# Patient Record
Sex: MALE | NOT HISPANIC OR LATINO | Employment: FULL TIME | URBAN - METROPOLITAN AREA
[De-identification: names, ages, dates, MRNs, and addresses within clinical notes are randomized per-mention and may not be internally consistent; named-entity substitution may affect disease eponyms.]

---

## 2022-10-07 NOTE — PROGRESS NOTES
"Does Fredi have a CPAP/Bipap?  No     [place] Sleep Scale: 5  BMI: 29.77    Fredi is a 41 year old who is being evaluated via a billable video visit.      How would you like to obtain your AVS? MyChart  If the video visit is dropped, the invitation should be resent by: Text to cell phone: [phone]  Will anyone else be joining your video visit? No  {If patient encounters technical issues they should call [phone] :461311}            Subjective   Fredi is a 41 year old, presenting for the following health issues:  Sleep Problem      HPI     ***    Review of Systems   {ROS COMP (Optional):207417}      Objective           Vitals:  No vitals were obtained today due to virtual visit.    Physical Exam   {video visit exam brief selected:447254::\"GENERAL: Healthy, alert and no distress\",\"EYES: Eyes grossly normal to inspection.  No discharge or erythema, or obvious scleral/conjunctival abnormalities.\",\"RESP: No audible wheeze, cough, or visible cyanosis.  No visible retractions or increased work of breathing.  \",\"SKIN: Visible skin clear. No significant rash, abnormal pigmentation or lesions.\",\"NEURO: Cranial nerves grossly intact.  Mentation and speech appropriate for age.\",\"PSYCH: Mentation appears normal, affect normal/bright, judgement and insight intact, normal speech and appearance well-groomed.\"}    {Diagnostic Test Results (Optional):864298}    {AMBULATORY ATTESTATION (Optional):171614}        Video-Visit Details    Video Start Time: {video visit start/end time for provider to select:766573}    Type of service:  Video Visit    Video End Time:{video visit start/end time for provider to select:947092}    Originating Location (pt. Location): {video visit patient location:645803::\"Home\"}    Distant Location (provider location):  [facility]     Platform used for Video Visit: {Virtual Visit Platforms:213871::\"HeTexted\"}      Sleep Consultation:    Date on this visit: 10/10/2022    Fredi Gibbs is " "sent by No ref. provider found for a sleep consultation regarding ***.    Primary Physician: No primary care provider on file.     Fredi Gibbs reports {FREQUENCY SLEEP PROBLEMS:456904} for ***.     Fredi goes to sleep at {TIMES OF DAY:904943} {AM/PM:159514} during the week. He wakes up at {TIMES OF DAY:090619} {AM/PM:968552} {WITH/WITHOUT:897244::\"with\"} an alarm. He falls asleep in {SLEEP TIMES:578478} minutes.  Fredi {HAS/DENIES/NA:865031} difficulty falling asleep.  He wakes up {WAKES UP:773609} times a night for {SLEEP TIMES:896421} minutes before falling back to sleep.  Fredi wakes up to {WAKING ACTIVITIES:248899}.  On weekends, Fredi goes to sleep at {TIMES OF DAY:166414} {AM/PM:723928}.  He wakes up at {TIMES OF DAY:041450} {AM/PM:116355} {WITH/WITHOUT:521626::\"with\"} an alarm. He falls asleep in {SLEEP TIMES:267255} minutes.  Patient gets an average of *** hours of sleep per night.     Patient {DOES AND DOES NOT:115168}.     Fredi {DOES/DOES NOT:223865::\"does not\"} do shift work.  He works {WORK SHIFT:794330} shifts.      Fredi {DOES/DOES NOT:041435::\"does not\"} snore {SNORE:136617}. Patient {DOES/NOT:343669} have a regular bed partner. There {IS/IS NOT:9024} report of {SLEEP BEHAVIOR:620807}.  He {DOES/DOES NOT:240339::\"does not\"} have witnessed apneas. They {OCCASIONALLY:422055} sleep separately.  Patient sleeps on his {SLEEP POSITION:723203::\"back\"}. He {HAS/DENIES SLEEP DISTURBANCE :122664}. Fredi {Parasomnia:569995}.    He {CONFIRM/DENY:605481} as a child.  Fredi {HAS/DENIES ROS:545682}.      Fredi has gained {NUMBERS 0-5,5-10,10-15:625467} pounds {TIMEFRAME:379294}.  Patient describes themself as {MORNING/NIGHT:112974} person.  He would prefer to go to sleep at {TIMES OF DAY:520603} {AM/PM:122263} and wake up at {TIMES OF DAY:075512} {AM/PM:124773}.  Patient's [place] Sleepiness score ***/24 {CONSISTENT:957238} with {MILD/MODERATE/SEVERE:317467} daytime sleepiness.      Fredi naps {NUMBER RANGE:487897} times per " {DAYS:581810} for {NAP RANGE:905277} minutes, feels {REFRESHED/UNREFRESHED:728816} after naps. He takes {NAPS:884971} inadvertant naps.  He {ADMIT/DENY:872895} {DOZIN} {DRIVIN}. The most recent episode was {NUMBERS 0-12:174198} {DAY:080355} ago.  Patient was counseled on the importance of driving while alert, to pull over if drowsy, or nap before getting into the vehicle if sleepy.  He uses {CAFFEINE INTAKE:792498}. Last caffeine intake is usually before ***.    Allergies:    No Known Allergies    Medications:    No current outpatient medications on file.       Problem List:  Patient Active Problem List    Diagnosis Date Noted     CARDIOVASCULAR SCREENING; LDL GOAL LESS THAN 160 10/31/2010     Priority: Medium        Past Medical/Surgical History:  Past Medical History:   Diagnosis Date     NO ACTIVE PROBLEMS      Past Surgical History:   Procedure Laterality Date     TONSILLECTOMY         Social History:  Social History     Socioeconomic History     Marital status:      Spouse name: Not on file     Number of children: Not on file     Years of education: Not on file     Highest education level: Not on file   Occupational History     Occupation:      Employer: [employer]   Tobacco Use     Smoking status: Never Smoker     Smokeless tobacco: Never Used   Substance and Sexual Activity     Alcohol use: Yes     Comment: occasional     Drug use: No     Sexual activity: Yes     Partners: Female   Other Topics Concern     Parent/sibling w/ CABG, MI or angioplasty before 65F 55M? Yes     Comment: father with cyst on his heart at 11 years old   Social History Narrative     Not on file     Social Determinants of Health     Financial Resource Strain: Not on file   Food Insecurity: Not on file   Transportation Needs: Not on file   Physical Activity: Not on file   Stress: Not on file   Social Connections: Not on file   Intimate Partner Violence: Not on file   Housing Stability: Not on file  "      Family History:  No family history on file.    Review of Systems:  A complete review of systems reviewed by me is negative with the exeption of what has been mentioned in the history of present illness.  {SLEEP ROS QUESTIONS:127370::\"CONSTITUTIONAL: NEGATIVE for weight gain/loss, fever, chills, sweats or night sweats, drug allergies.\",\"EYES: NEGATIVE for changes in vision, blind spots, double vision.\",\"ENT: NEGATIVE for ear pain, sore throat, sinus pain, post-nasal drip, runny nose, bloody nose\",\"CARDIAC: NEGATIVE for fast heartbeats or fluttering in chest, chest pain or pressure, breathlessness when lying flat, swollen legs or swollen feet.\",\"NEUROLOGIC: NEGATIVE headaches, weakness or numbness in the arms or legs.\",\"DERMATOLOGIC: NEGATIVE for rashes, new moles or change in mole(s)\",\"PULMONARY: NEGATIVE SOB at rest, SOB with activity, dry cough, productive cough, coughing up blood, wheezing or whistling when breathing.  \",\"GASTROINTESTINAL: NEGATIVE for nausea or vomitting, loose or watery stools, fat or grease in stools, constipation, abdominal pain, bowel movements black in color or blood noted.\",\"GENITOURINARY: NEGATIVE for pain during urination, blood in urine, urinating more frequently than usual, irregular menstrual periods.\",\"MUSCULOSKELETAL: NEGATIVE for muscle pain, bone or joint pain, swollen joints.\",\"ENDOCRINE: NEGATIVE for increased thirst or urination, diabetes.\",\"LYMPHATIC: NEGATIVE for swollen lymph nodes, lumps or bumps in the breasts or nipple discharge.\"}    Physical Examination:  Vitals: There were no vitals taken for this visit.  BMI= There is no height or weight on file to calculate BMI.         No flowsheet data found.         {SHORT EXAM:292095}  Mallampati Class: {YARED NUMERAL I-IV:156083}.  Tonsillar Stage: {NUMBERS 1-4:340932}.    Impression/Plan:    ***  Literature provided regarding {SLEEP APNEA:475193}.      He will follow up with me in approximately two weeks after his sleep " study has been competed to review the results and discuss plan of care.       Polysomnography reviewed.  Obstructive sleep apnea reviewed.  Complications of untreated sleep apnea were reviewed.    ***    CC: No ref. provider found

## 2022-10-10 ENCOUNTER — VIRTUAL VISIT (OUTPATIENT)
Dept: SLEEP MEDICINE | Facility: CLINIC | Age: 42
End: 2022-10-10
Payer: COMMERCIAL

## 2022-10-10 DIAGNOSIS — Z91.199 NO-SHOW FOR APPOINTMENT: Primary | ICD-10-CM

## 2022-10-10 NOTE — PROGRESS NOTES
"Does Fredi have a CPAP/Bipap?  No     [place] Sleep Scale: 5  BMI: 29.77    Fredi is a 41 year old who is being evaluated via a billable video visit.      How would you like to obtain your AVS? {AVS Preference:640176}  If the video visit is dropped, the invitation should be resent by: {video visit invitation (Optional) :148131}  Will anyone else be joining your video visit? {:781367}  {If patient encounters technical issues they should call [phone] :918933}        {PROVIDER CHARTING PREFERENCE:070327}    Subjective   Fredi is a 41 year old{ACCOMPANIED BY STATEMENT (Optional):429471}, presenting for the following health issues:  Sleep Problem (Snoring )      HPI     ***    Review of Systems   {ROS COMP (Optional):851626}      Objective           Vitals:  No vitals were obtained today due to virtual visit.    Physical Exam   {video visit exam brief selected:488071::\"GENERAL: Healthy, alert and no distress\",\"EYES: Eyes grossly normal to inspection.  No discharge or erythema, or obvious scleral/conjunctival abnormalities.\",\"RESP: No audible wheeze, cough, or visible cyanosis.  No visible retractions or increased work of breathing.  \",\"SKIN: Visible skin clear. No significant rash, abnormal pigmentation or lesions.\",\"NEURO: Cranial nerves grossly intact.  Mentation and speech appropriate for age.\",\"PSYCH: Mentation appears normal, affect normal/bright, judgement and insight intact, normal speech and appearance well-groomed.\"}    {Diagnostic Test Results (Optional):381241}    {AMBULATORY ATTESTATION (Optional):566855}        Video-Visit Details    Video Start Time: {video visit start/end time for provider to select:395214}    Type of service:  Video Visit    Video End Time:{video visit start/end time for provider to select:440916}    Originating Location (pt. Location): {video visit patient location:104633::\"Home\"}    Distant Location (provider location):  [facility]     Platform used for Video " "Visit: {Virtual Visit Platforms:368643::\"Carlos Enrique\"}    "

## 2022-10-11 ENCOUNTER — VIRTUAL VISIT (OUTPATIENT)
Dept: SLEEP MEDICINE | Facility: CLINIC | Age: 42
End: 2022-10-11
Payer: COMMERCIAL

## 2022-10-11 DIAGNOSIS — R29.818 SUSPECTED SLEEP APNEA: Primary | ICD-10-CM

## 2022-10-11 PROCEDURE — 99203 OFFICE O/P NEW LOW 30 MIN: CPT | Mod: 95 | Performed by: PHYSICIAN ASSISTANT

## 2022-10-11 NOTE — PATIENT INSTRUCTIONS
"      MY TREATMENT INFORMATION FOR SLEEP APNEA-  Fredi Gibbs    DOCTOR : AKBAR Diaz-MOISES    Am I having a sleep study at a sleep center?  --->Due to normal delays, you will be contacted within 2-4 weeks to schedule    Am I having a home sleep study?  --->Watch the video for the device you are using:    -/drop off device-   https://www.[employer].com/watch?v=yGGFBdELGhk    -Disposable device sent out require phone/computer application-   https://www.[employer].com/watch?v=BCce_vbiwxE      Frequently asked questions:  1. What is Obstructive Sleep Apnea (GM)? MG is the most common type of sleep apnea. Apnea means, \"without breath.\"  Apnea is most often caused by narrowing or collapse of the upper airway as muscles relax during sleep.   Almost everyone has occasional apneas. Most people with sleep apnea have had brief interruptions at night frequently for many years.  The severity of sleep apnea is related to how frequent and severe the events are.   2. What are the consequences of MG? Symptoms include: feeling sleepy during the day, snoring loudly, gasping or stopping of breathing, trouble sleeping, and occasionally morning headaches or heartburn at night.  Sleepiness can be serious and even increase the risk of falling asleep while driving. Other health consequences may include development of high blood pressure and other cardiovascular disease in persons who are susceptible. Untreated MG  can contribute to heart disease, stroke and diabetes.   3. What are the treatment options? In most situations, sleep apnea is a lifelong disease that must be managed with daily therapy. Medications are not effective for sleep apnea and surgery is generally not considered until other therapies have been tried. Your treatment is your choice . Continuous Positive Airway (CPAP) works right away and is the therapy that is effective in nearly everyone. An oral device to hold your jaw forward is usually the next most reliable " option. Other options include postioning devices (to keep you off your back), weight loss, and surgery including a tongue pacing device. There is more detail about some of these options below.  4. Are my sleep studies covered by insurance? Although we will request verification of coverage, we advise you also check in advance of the study to ensure there is coverage.    Important tips for those choosing CPAP and similar devices   Know your equipment:  CPAP is continuous positive airway pressure that prevents obstructive sleep apnea by keeping the throat from collapsing while you are sleeping. In most cases, the device is  smart  and can slowly self-adjusts if your throat collapses and keeps a record every day of how well you are treated-this information is available to you and your care team.  BPAP is bilevel positive airway pressure that keeps your throat open and also assists each breath with a pressure boost to maintain adequate breathing.  Special kinds of BPAP are used in patients who have inadequate breathing from lung or heart disease. In most cases, the device is  smart  and can slowly self-adjusts to assist breathing. Like CPAP, the device keeps a record of how well you are treated.  Your mask is your connection to the device. You get to choose what feels most comfortable and the staff will help to make sure if fits. Here: are some examples of the different masks that are available:       Key points to remember on your journey with sleep apnea:  Sleep study.  PAP devices often need to be adjusted during a sleep study to show that they are effective and adjusted right.  Good tips to remember: Try wearing just the mask during a quiet time during the day so your body adapts to wearing it. A humidifier is recommended for comfort in most cases to prevent drying of your nose and throat. Allergy medication from your provider may help you if you are having nasal congestion.  Getting settled-in. It takes more than  one night for most of us to get used to wearing a mask. Try wearing just the mask during a quiet time during the day so your body adapts to wearing it. A humidifier is recommended for comfort in most cases. Our team will work with you carefully on the first day and will be in contact within 4 days and again at 2 and 4 weeks for advice and remote device adjustments. Your therapy is evaluated by the device each day.   Use it every night. The more you are able to sleep naturally for 7-8 hours, the more likely you will have good sleep and to prevent health risks or symptoms from sleep apnea. Even if you use it 4 hours it helps. Occasionally all of us are unable to use a medical therapy, in sleep apnea, it is not dangerous to miss one night.   Communicate. Call our skilled team on the number provided on the first day if your visit for problems that make it difficult to wear the device. Over 2 out of 3 patients can learn to wear the device long-term with help from our team. Remember to call our team or your sleep providers if you are unable to wear the device as we may have other solutions for those who cannot adapt to mask CPAP therapy. It is recommended that you sleep your sleep provider within the first 3 months and yearly after that if you are not having problems.   Use it for your health. We encourage use of CPAP masks during daytime quiet periods to allow your face and brain to adapt to the sensation of CPAP so that it will be a more natural sensation to awaken to at night or during naps. This can be very useful during the first few weeks or months of adapting to CPAP though it does not help medically to wear CPAP during wakefulness and  should not be used as a strategy just to meet guidelines.  Take care of your equipment. Make sure you clean your mask and tubing using directions every day and that your filter and mask are replaced as recommended or if they are not working.     BESIDES CPAP, WHAT OTHER THERAPIES  ARE THERE?    Positioning Device  Positioning devices are generally used when sleep apnea is mild and only occurs on your back.This example shows a pillow that straps around the waist. It may be appropriate for those whose sleep study shows milder sleep apnea that occurs primarily when lying flat on one's back. Preliminary studies have shown benefit but effectiveness at home may need to be verified by a home sleep test. These devices are generally not covered by medical insurance.  Examples of devices that maintain sleeping on the back to prevent snoring and mild sleep apnea.    Belt type body positioner  http://[employer]/    Electronic reminder  http://nightshifttherapy.[email or web address]            Oral Appliance  What is oral appliance therapy?  An oral appliance device fits on your teeth at night like a retainer used after having braces. The device is made by a specialized dentist and requires several visits over 1-2 months before a manufactured device is made to fit your teeth and is adjusted to prevent your sleep apnea. Once an oral device is working properly, snoring should be improved. A home sleep test may be recommended at that time if to determine whether the sleep apnea is adequately treated.       Some things to remember:  -Oral devices are often, but not always, covered by your medical insurance. Be sure to check with your insurance provider.   -If you are referred for oral therapy, you will be given a list of specialized dentists to consider or you may choose to visit the Web site of the American Academy of Dental Sleep Medicine  -Oral devices are less likely to work if you have severe sleep apnea or are extremely overweight.     More detailed information  An oral appliance is a small acrylic device that fits over the upper and lower teeth  (similar to a retainer or a mouth guard). This device slightly moves jaw forward, which moves the base of the tongue forward, opens the airway, improves breathing for  effective treat snoring and obstructive sleep apnea in perhaps 7 out of 10 people .  The best working devices are custom-made by a dental device  after a mold is made of the teeth 1, 2, 3.  When is an oral appliance indicated?  Oral appliance therapy is recommended as a first-line treatment for patients with primary snoring, mild sleep apnea, and for patients with moderate sleep apnea who prefer appliance therapy to use of CPAP4, 5. Severity of sleep apnea is determined by sleep testing and is based on the number of respiratory events per hour of sleep.   How successful is oral appliance therapy?  The success rate of oral appliance therapy in patients with mild sleep apnea is 75-80% while in patients with moderate sleep apnea it is 50-70%. The chance of success in patients with severe sleep apnea is 40-50%. The research also shows that oral appliances have a beneficial effect on the cardiovascular health of MG patients at the same magnitude as CPAP therapy7.  Oral appliances should be a second-line treatment in cases of severe sleep apnea, but if not completely successful then a combination therapy utilizing CPAP plus oral appliance therapy may be effective. Oral appliances tend to be effective in a broad range of patients although studies show that the patients who have the highest success are females, younger patients, those with milder disease, and less severe obesity. 3, 6.   Finding a dentist that practices dental sleep medicine  Specific training is available through the American Academy of Dental Sleep Medicine for dentists interested in working in the field of sleep. To find a dentist who is educated in the field of sleep and the use of oral appliances, near you, visit the Web site of the American Academy of Dental Sleep Medicine.    References  1. Eliezer et al. Objectively measured vs self-reported compliance during oral appliance therapy for sleep-disordered breathing. Chest 2013; 144(5):  6102-2412.  2. Yandel et al. Objective measurement of compliance during oral appliance therapy for sleep-disordered breathing. Thorax 2013; 68(1): 91-96.  3. Ruslan et al. Mandibular advancement devices in 620 men and women with MG and snoring: tolerability and predictors of treatment success. Chest 2004; 125: 6268-9476.  4. Nigel, et al. Oral appliances for snoring and MG: a review. Sleep 2006; 29: 244-262.  5. Kayleigh et al. Oral appliance treatment for MG: an update. J Clin Sleep Med 2014; 10(2): 215-227.  6. Kike et al. Predictors of OSAH treatment outcome. J Dent Res 2007; 86: 9244-6989.      Weight Loss:    Weight loss is a long-term strategy that may improve sleep apnea in some patients.    Weight management is a personal decision and the decision should be based on your interest and the potential benefits.  If you are interested in exploring weight loss strategies, the following discussion covers the impact on weight loss on sleep apnea and the approaches that may be successful.    Being overweight does not necessarily mean you will have health consequences.  Those who have BMI over 35 or over 27 with existing medical conditions carries greater risk.   Weight loss decreases severity of sleep apnea in most people with obesity. For those with mild obesity who have developed snoring with weight gain, even 15-30 pound weight loss can improve and occasionally eliminate sleep apnea.  Structured and life-long dietary and health habits are necessary to lose weight and keep healthier weight levels.     Though there may be significant health benefits from weight loss, long-term weight loss is very difficult to achieve- studies show success with dietary management in less than 10% of people. In addition, substantial weight loss may require years of dietary control and may be difficult if patients have severe obesity. In these cases, surgical management may be considered.  Finally, older  individuals who have tolerated obesity without health complications may be less likely to benefit from weight loss strategies.      [unfilled]    Surgery:    Surgery for obstructive sleep apnea is considered generally only when other therapies fail to work. Surgery may be discussed with you if you are having a difficult time tolerating CPAP and or when there is an abnormal structure that requires surgical correction.  Nose and throat surgeries often enlarge the airway to prevent collapse.  Most of these surgeries create pain for 1-2 weeks and up to half of the most common surgeries are not effective throughout life.  You should carefully discuss the benefits and drawbacks to surgery with your sleep provider and surgeon to determine if it is the best solution for you.   More information  Surgery for MG is directed at areas that are responsible for narrowing or complete obstruction of the airway during sleep.  There are a wide range of procedures available to enlarge and/or stabilize the airway to prevent blockage of breathing in the three major areas where it can occur: the palate, tongue, and nasal regions.  Successful surgical treatment depends on the accurate identification of the factors responsible for obstructive sleep apnea in each person.  A personalized approach is required because there is no single treatment that works well for everyone.  Because of anatomic variation, consultation with an examination by a sleep surgeon is a critical first step in determining what surgical options are best for each patient.  In some cases, examination during sedation may be recommended in order to guide the selection of procedures.  Patients will be counseled about risks and benefits as well as the typical recovery course after surgery. Surgery is typically not a cure for a person s MG.  However, surgery will often significantly improve one s MG severity (termed  success rate ).  Even in the absence of a cure, surgery  will decrease the cardiovascular risk associated with OSA7; improve overall quality of life8 (sleepiness, functionality, sleep quality, etc).      Palate Procedures:  Patients with MG often have narrowing of their airway in the region of their tonsils and uvula.  The goals of palate procedures are to widen the airway in this region as well as to help the tissues resist collapse.  Modern palate procedure techniques focus on tissue conservation and soft tissue rearrangement, rather than tissue removal.  Often the uvula is preserved in this procedure. Residual sleep apnea is common in patient after pharyngoplasty with an average reduction in sleep apnea events of 33%2.      Tongue Procedures:  ExamWhile patients are awake, the muscles that surround the throat are active and keep this region open for breathing. These muscles relax during sleep, allowing the tongue and other structures to collapse and block breathing.  There are several different tongue procedures available.  Selection of a tongue base procedure depends on characteristics seen on physical exam.  Generally, procedures are aimed at removing bulky tissues in this area or preventing the back of the tongue from falling back during sleep.  Success rates for tongue surgery range from 50-62%3.    Hypoglossal Nerve Stimulation:  Hypoglossal nerve stimulation has recently received approval from the [place] Food and Drug Administration for the treatment of obstructive sleep apnea.  This is based on research showing that the system was safe and effective in treating sleep apnea6.  Results showed that the median AHI score decreased 68%, from 29.3 to 9.0. This therapy uses an implant system that senses breathing patterns and delivers mild stimulation to airway muscles, which keeps the airway open during sleep.  The system consists of three fully implanted components: a small generator (similar in size to a pacemaker), a breathing sensor, and a stimulation lead.   Using a small handheld remote, a patient turns the therapy on before bed and off upon awakening.    Candidates for this device must be greater than 22 years of age, have moderate to severe MG (AHI between 20-65), BMI less than 32, have tried CPAP/oral appliance without success, and have appropriate upper airway anatomy (determined by a sleep endoscopy performed by Dr. Gale).    Hypoglossal Nerve Stimulation Pathway:    The sleep surgeon s office will work with the patient through the insurance prior-authorization process (including communications and appeals).    Nasal Procedures:  Nasal obstruction can interfere with nasal breathing during the day and night.  Studies have shown that relief of nasal obstruction can improve the ability of some patients to tolerate positive airway pressure therapy for obstructive sleep apnea1.  Treatment options include medications such as nasal saline, topical corticosteroid and antihistamine sprays, and oral medications such as antihistamines or decongestants. Non-surgical treatments can include external nasal dilators for selected patients. If these are not successful by themselves, surgery can improve the nasal airway either alone or in combination with these other options.      Combination Procedures:  Combination of surgical procedures and other treatments may be recommended, particularly if patients have more than one area of narrowing or persistent positional disease.  The success rate of combination surgery ranges from 66-80%2,3.    References  Davis DE LEON. The Role of the Nose in Snoring and Obstructive Sleep Apnoea: An Update.  Eur Arch Otorhinolaryngol. 2011; 268: 1365-73.   Maria Ines SM; Tricia JA; Donna JR; Pallanch JF; Luis M MB; Rei SG; Prasad ASH. Surgical modifications of the upper airway for obstructive sleep apnea in adults: a systematic review and meta-analysis. SLEEP 2010;33(10):0836-4730. Chuck BUENO. Hypopharyngeal surgery in obstructive sleep apnea: an  evidence-based medicine review.  Arch Otolaryngol Head Neck Surg. 2006 Feb;132(2):206-13.  Vahid YH1, Irma Y, Nirav NAKITA. The efficacy of anatomically based multilevel surgery for obstructive sleep apnea. Otolaryngol Head Neck Surg. 2003 Oct;129(4):327-35.  Chuck BUENO, Goldberg A. Hypopharyngeal Surgery in Obstructive Sleep Apnea: An Evidence-Based Medicine Review. Arch Otolaryngol Head Neck Surg. 2006 Feb;132(2):206-13.  Tello MEEK et al. Upper-Airway Stimulation for Obstructive Sleep Apnea.  N Engl J Med. 2014 Jan 9;370(2):139-49.  Pérez Y et al. Increased Incidence of Cardiovascular Disease in Middle-aged Men with Obstructive Sleep Apnea. Am J Respir Crit Care Med; 2002 166: 159-165  Starkeyrishabh CLARK et al. Studying Life Effects and Effectiveness of Palatopharyngoplasty (SLEEP) study: Subjective Outcomes of Isolated Uvulopalatopharyngoplasty. Otolaryngol Head Neck Surg. 2011; 144: 623-631.        WHAT IF I ONLY HAVE SNORING?    Mandibular advancement devices, lateral sleep positioning, long-term weight loss and treatment of nasal allergies have been shown to improve snoring.  Exercising tongue muscles with a game (Riskthinktankttps://apps.[employer]ant[employer]pper/us/sandy/soundly-reduce-snoring/yq9945784966) or stimulating the tongue during the day with a device (https://doi.org/10.3390/xip19349332) have improved snoring in some individuals.    Remember to Drive Safe... Drive Alive     Sleep health profoundly affects your health, mood, and your safety.  Thirty three percent of the population (one in three of us) is not getting enough sleep and many have a sleep disorder. Not getting enough sleep or having an untreated / undertreated sleep condition may make us sleepy without even knowing it. In fact, our driving could be dramatically impaired due to our sleep health. As your provider, here are some things I would like you to know about driving:     Here are some warning signs for impairment and dangerous drowsy driving:              -Having been  awake more than 16 hours               -Looking tired               -Eyelid drooping              -Head nodding (it could be too late at this point)              -Driving for more than 30 minutes     Some things you could do to make the driving safer if you are experiencing some drowsiness:              -Stop driving and rest              -Call for transportation              -Make sure your sleep disorder is adequately treated     Some things that have been shown NOT to work when experiencing drowsiness while driving:              -Turning on the radio              -Opening windows              -Eating any  distracting  /  entertaining  foods (e.g., sunflower seeds, candy, or any other)              -Talking on the phone      Your decision may not only impact your life, but also the life of others. Please, remember to drive safe for yourself and all of us.

## 2022-10-11 NOTE — PROGRESS NOTES
Does Fredi have a CPAP/Bipap?  No     [place] Sleep Scale: 5  BMI: 29.77    Fredi is a 41 year old who is being evaluated via a billable video visit.      How would you like to obtain your AVS? MyChart  If the video visit is dropped, the invitation should be resent by: Text to cell phone: [phone]  Will anyone else be joining your video visit? No              Subjective   Fredi is a 41 year old presenting for the following health issues:  Sleep Problem (Snoring )            Objective           Vitals:  No vitals were obtained today due to virtual visit.    Physical Exam   GENERAL: Healthy, alert and no distress  EYES: Eyes grossly normal to inspection.  No discharge or erythema, or obvious scleral/conjunctival abnormalities.  RESP: No audible wheeze, cough, or visible cyanosis.  No visible retractions or increased work of breathing.    SKIN: Visible skin clear. No significant rash, abnormal pigmentation or lesions.  NEURO: Cranial nerves grossly intact.  Mentation and speech appropriate for age.  PSYCH: Mentation appears normal, affect normal/bright, judgement and insight intact, normal speech and appearance well-groomed.            Video-Visit Details    Video Start Time: 8:26 AM    Type of service:  Video Visit    Video End Time:8:47 AM    Originating Location (pt. Location): Home    Distant Location (provider location):  [facility]     Platform used for Video Visit: [facility]    Outpatient Sleep Medicine Consultation:      Name: Fredi Gibbs MRN# [id number]   Age: 41 year old YOB: 1980     Date of Consultation: October 11, 2022  Consultation is requested by: No referring provider defined for this encounter. No ref. provider found  Primary care provider: No primary care provider on file.       Reason for Sleep Consult:     Fredi Gibbs is sent by No ref. provider found for a sleep consultation regarding snoring, sleep apnea..    Patient s Reason for visit  snoring  Fredi Gibbs  main reason for visit:  possible sleep apnea  Patient states problem(s) started:    Fredi Gibbs's goals for this visit:             Assessment and Plan:     Summary Sleep Diagnoses:  Suspected sleep apnea- snoring, witnessed apneas, jesi in father.     Comorbid Diagnoses:  none      Summary Recommendations:  Home sleep study for suspected sleep apnea.   No orders of the defined types were placed in this encounter.        Summary Counseling:    Sleep Testing Reviewed  Obstructive Sleep Apnea Reviewed  Complications of Untreated Sleep Apnea Reviewed      Medical Decision-making:   Educational materials provided in instructions    Total time spent reviewing medical records, history and physical examination, review of previous testing and interpretation as well as documentation on this date:30 min    CC: No ref. provider found          History of Present Illness:     Past Sleep Evaluations:    SLEEP-WAKE SCHEDULE:     Work/School Days: Patient goes to school/work:     Usually gets into bed at  10-11  Takes patient about  5 min or less to fall asleep  Has trouble falling asleep   0 nights per week  Wakes up in the middle of the night  2 times.  Wakes up due to  bathroom, uncertain reasons  He has trouble falling back asleep  0  times a week.   It usually takes   5 [place] get back to sleep  Patient is usually up at  5 AM  Uses alarm:  yes    Weekends/Non-work Days/All Other Days:  Usually gets into bed at   10-11  Takes patient about  5 min to fall asleep  Patient is usually up at  5 am-7 AM  Uses alarm:      Sleep Need  Patient gets    6-7 sleep on average   Patient thinks he needs about   6-7 sleep    Fredi Gibbs prefers to sleep in this position(s):     Patient states they do the following activities in bed:      Naps  Patient takes a purposeful nap   0 times a week and naps are usually   in duration  He feels better after a nap:    He dozes off unintentionally  0 days per week  Patient has had a driving accident or  near-miss due to sleepiness/drowsiness:  occasional drowsiness      SLEEP DISRUPTIONS:    Breathing/Snoring  Patient snores: yes  Other people complain about his snoring:  yes  Patient has been told he stops breathing in his sleep: yes  He has issues with the following:      Movement:  Patient gets pain, discomfort, with an urge to move:     It happens when he is resting:     It happens more at night:     Patient has been told he kicks his legs at night:   no     Behaviors in Sleep:  Fredi Gibbs has experienced the following behaviors while sleeping:    He has experienced sudden muscle weakness during the day:  no      Is there anything else you would like your sleep provider to know:  no        CAFFEINE AND OTHER SUBSTANCES:    Patient consumes caffeinated beverages per day:   8-10 cups  Last caffeine use is usually:  3 PM  List of any prescribed or over the counter stimulants that patient takes:    List of any prescribed or over the counter sleep medication patient takes:    List of previous sleep medications that patient has tried:    Patient drinks alcohol to help them sleep:  no  Patient drinks alcohol near bedtime:  no    Family History:  Patient has a family member been diagnosed with a sleep disorder:  Father CPAP            SCALES:    EPWORTH SLEEPINESS SCALE      [place] Sleepiness Scale ( DOM Ruelas  [id number]<br>ESS - [place]/English - Final version - 21 Nov 07 - [facility] [employer].) 10/10/2022   [place] Score (Sleep) 5         INSOMNIA SEVERITY INDEX (SINCERE)      Insomnia Severity Index (SINCERE) 10/10/2022   Difficulty falling asleep 2   Difficulty staying asleep 2   Problems waking up too early 0   How SATISFIED/DISSATISFIED are you with your CURRENT sleep pattern? 2   How NOTICEABLE to others do you think your sleep problem is in terms of impairing the quality of your life? 1   How WORRIED/DISTRESSED are you about your current sleep problem? 2   To what extent do you consider your sleep problem to  "INTERFERE with your daily functioning (e.g. daytime fatigue, mood, ability to function at work/daily chores, concentration, memory, mood, etc.) CURRENTLY? 1   SINCERE Total Score 10       Guidelines for Scoring/Interpretation:  Total score categories:  0-7 = No clinically significant insomnia   8-14 = Subthreshold insomnia   15-21 = Clinical insomnia (moderate severity)  22-28 = Clinical insomnia (severe)  Used via courtesy of www.[employer].va.gov with permission from Wilfredo Herndon PhD., [facility]      STOP BANG     STOP BANG Questionnaire (  2008, the American Society of Anesthesiologists, Inc. [employer] 1/19/2012   B/P Clinic: 129/75   BMI Clinic: 29.77         GAD7    No flowsheet data found.      CAGE-AID    No flowsheet data found.    CAGE-AID reprinted with permission from the [place] Medical Journal, MIRI Bernal and HEIDE Small, \"Conjoint screening questionnaires for alcohol and drug abuse\" [place] Medical Journal 94: 135-140, 1995.      PATIENT HEALTH QUESTIONNAIRE-9 (PHQ - 9)    No flowsheet data found.    Developed by Maddy Luis, Trisha Hernández, Darius Cortés and colleagues, with an educational thierry from [employer] No permission required to reproduce, translate, display or distribute.        Allergies:    No Known Allergies    Medications:    No current outpatient medications on file.       Problem List:  Patient Active Problem List    Diagnosis Date Noted     CARDIOVASCULAR SCREENING; LDL GOAL LESS THAN 160 10/31/2010     Priority: Medium        Past Medical/Surgical History:  Past Medical History:   Diagnosis Date     NO ACTIVE PROBLEMS      Past Surgical History:   Procedure Laterality Date     TONSILLECTOMY  1997       Social History:  Social History     Socioeconomic History     Marital status:      Spouse name: Not on file     Number of children: Not on file     Years of education: Not on file     Highest education level: Not on file "   Occupational History     Occupation:      Employer: [employer]   Tobacco Use     Smoking status: Never     Smokeless tobacco: Never   Substance and Sexual Activity     Alcohol use: Yes     Comment: occasional     Drug use: No     Sexual activity: Yes     Partners: Female   Other Topics Concern     Parent/sibling w/ CABG, MI or angioplasty before 65F 55M? Yes     Comment: father with cyst on his heart at 11 years old   Social History Narrative     Not on file     Social Determinants of Health     Financial Resource Strain: Not on file   Food Insecurity: Not on file   Transportation Needs: Not on file   Physical Activity: Not on file   Stress: Not on file   Social Connections: Not on file   Intimate Partner Violence: Not on file   Housing Stability: Not on file       Family History:  No family history on file.    Review of Systems:  A complete review of systems reviewed by me is negative with the exeption of what has been mentioned in the history of present illness.      Physical Examination:  Vitals: There were no vitals taken for this visit.  BMI= There is no height or weight on file to calculate BMI.                    Data: All pertinent previous laboratory data reviewed     No results for input(s): NA, POTASSIUM, CHLORIDE, CO2, ANIONGAP, GLC, BUN, CR, RISA in the last 22456 hours.    No results for input(s): WBC, RBC, HGB, HCT, MCV, MCH, MCHC, RDW, PLT in the last 03275 hours.    No results for input(s): PROTTOTAL, ALBUMIN, BILITOTAL, ALKPHOS, AST, ALT, BILIDIRECT in the last 77923 hours.    No results found for: TSH    No results found for: UAMP, UBARB, BENZODIAZEUR, UCANN, UCOC, OPIT, UPCP    No results found for: IRONSAT, [id number], DOTTIE    No results found for: PH, PHARTERIAL, PO2, [id number], SAT, PCO2, HCO3, BASEEXCESS, MERLE, BEB    @LABRCNTIPR(phv:4,pco2v:4,po2v:4,hco3v:4,madai:4,o2per:4)@    Echocardiology: No results found for this or any previous visit (from the past 4320 hour(s)).    Chest x-ray: No  results found for this or any previous visit from the past 365 days.      Chest CT: No results found for this or any previous visit from the past 365 days.      PFT: Most Recent [place] Pulmonary Function Testing    No results found for: 20001  No results found for: 20002  No results found for: 20003  No results found for: 20015  No results found for: 20016  No results found for: 20027  No results found for: 20028  No results found for: 20029  No results found for: 20079  No results found for: 20080  No results found for: 20081  No results found for: 20335  No results found for: 20105  No results found for: 20053  No results found for: 20054  No results found for: 20055      Campbell Tomlin MA 10/11/2022

## 2022-10-20 ENCOUNTER — OFFICE VISIT (OUTPATIENT)
Dept: SLEEP MEDICINE | Facility: CLINIC | Age: 42
End: 2022-10-20
Attending: PHYSICIAN ASSISTANT
Payer: COMMERCIAL

## 2022-10-20 DIAGNOSIS — R29.818 SUSPECTED SLEEP APNEA: ICD-10-CM

## 2022-10-20 PROCEDURE — G0399 HOME SLEEP TEST/TYPE 3 PORTA: HCPCS | Mod: 52 | Performed by: OTOLARYNGOLOGY

## 2022-10-21 ENCOUNTER — APPOINTMENT (OUTPATIENT)
Dept: SLEEP MEDICINE | Facility: CLINIC | Age: 42
End: 2022-10-21
Payer: COMMERCIAL

## 2022-10-21 NOTE — PROGRESS NOTES
HST POST-STUDY QUESTIONNAIRE    1. What time did you go to bed?  2150  2. How long do you think it took to fall asleep?  10min  3. What time did you wake up to start the day?  0530  4. Did you get up during the night at all?  no  5. If you woke up, do you remember approximately what time(s)? -  6. Did you have any difficulty with the equipment?  No  7. Did you us any type of treatment with this study?  None  8. Was the head of the bed elevated? No  9. Did you sleep in a recliner?  No  10. Did you stop using CPAP at least 3 days before this test?  NA  11. Any other information you'd like us to know? -     This HSAT was performed using a Noxturnal T3 device which recorded snore, sound, movement activity, body position, nasal pressure, oronasal thermal airflow, pulse, oximetry and both chest and abdominal respiratory effort. HSAT data was restricted to the time patient states they were in bed.     HSAT was scored using 1B 4% hypopnea rule.     HST AHI (Non-PAT): 6.7  Snoring was reported as mild.  Time with SpO2 below 89% was 8.7 minutes.   Overall signal quality was good     Pt will follow up with sleep provider to determine appropriate therapy.

## 2022-10-21 NOTE — PROGRESS NOTES
Pt is completing a home sleep test. Pt was instructed on how to put on the Noxturnal T3 device and associated equipment before going to bed and given the opportunity to practice putting it on before leaving the sleep center. Pt was reminded to bring the home sleep test kit back to the center tomorrow, at agreed upon time for download and reporting.   Luisana Baptiste CMA, HST Specialist  [facility]

## 2022-10-28 NOTE — PROGRESS NOTES
"HOME SLEEP STUDY INTERPRETATION        Patient: Fredi Gibbs  MRN: [id number]  YOB: 1980  Study Date: 10/20/2022  PCP/Referring Provider: No Ref-Primary, Physician;   Ordering Provider: Declan Villegas PA-C         Indications for Home Study: Fredi Gibbs is a 41 year old adult with a history of snoring, apneas who presents with symptoms suggestive of obstructive sleep apnea.    Estimated body mass index is 29.71 kg/m  as calculated from the following:    Height as of 1/19/12: 1.816 m (5' 11.5\").    Weight as of 1/19/12: 98 kg (216 lb).  Total score - [place]: 5 (10/10/2022  9:00 AM)  STOP-BANGTotal Score: 4 (10/10/2022  9:59 AM)        Data: A full night home sleep study was performed recording the standard physiologic parameters including body position, movement, sound, nasal pressure, thermal oral airflow, chest and abdominal movements with respiratory inductance plethysmography, and oxygen saturation by pulse oximetry. Pulse rate was estimated by oximetry recording. This study was considered adequate based on > 4 hours of quality oximetry and respiratory recording. As specified by the AASM Manual for the Scoring of Sleep and Associated events, version 2.3, Rule VIII.D 1B, 4% oxygen desaturation scoring for hypopneas is used as a standard of care on all home sleep apnea testing.        Analysis Time:  305 minutes        Respiration:   Sleep Associated Hypoxemia: sustained hypoxemia was not present. Baseline oxygen saturation was 92%.  Time with saturation less than or equal to 88% was 4.6 minutes. The lowest oxygen saturation was 80%.   Snoring: Snoring was present.  Respiratory events: The home study revealed a presence of 20 obstructive apneas and 0 mixed and central apneas. There were 14 hypopneas resulting in a combined apnea/hypopnea index [AHI] of 6.7 events per hour.  AHI was 6.7 per hour supine, 0 per hour prone, 0 per hour on left side, and 0 per hour on right side.   Pattern: Excluding " events noted above, respiratory rate and pattern was Normal.      Position: Percent of time spent: supine - 100%, prone - 0%, on left - 0%, on right - 0%.      Heart Rate: By pulse oximetry tachycardia was noted.       Assessment:     Mild obstructive sleep apnea.    Sleep associated hypoxemia was not present.    Recommendations:    Consider auto-CPAP at 5-10 cmH2O or oral appliance therapy.    Suggest optimizing sleep hygiene and avoiding sleep deprivation.    Weight management.(BMI>30).        Diagnosis Code(s): Obstructive Sleep Apnea G47.33    Dale Tong MD,  October 28, 2022   Diplomate, American Board of Otolaryngology, Sleep Medicine

## 2022-11-04 ENCOUNTER — VIRTUAL VISIT (OUTPATIENT)
Dept: SLEEP MEDICINE | Facility: CLINIC | Age: 42
End: 2022-11-04
Payer: COMMERCIAL

## 2022-11-04 DIAGNOSIS — G47.33 OSA (OBSTRUCTIVE SLEEP APNEA): Primary | ICD-10-CM

## 2022-11-04 PROCEDURE — 99214 OFFICE O/P EST MOD 30 MIN: CPT | Mod: 95 | Performed by: PHYSICIAN ASSISTANT

## 2022-11-04 NOTE — PROGRESS NOTES
Does Fredi have a CPAP/Bipap?  No     [place] Sleep Scale: 5    Fredi is a 41 year old who is being evaluated via a billable telephone visit.      What phone number would you like to be contacted at?[phone]  How would you like to obtain your AVS? Crow Rai is a 41 year old, presenting for the following health issues:  Study Results            Objective           Vitals:  No vitals were obtained today due to virtual visit.    Physical Exam   healthy, alert and no distress  PSYCH: Alert and oriented times 3; coherent speech, normal   rate and volume, able to articulate logical thoughts, able   to abstract reason, no tangential thoughts, no hallucinations   or delusions  Fredi Gibbs's affect is normal  RESP: No cough, no audible wheezing, able to talk in full sentences  Remainder of exam unable to be completed due to telephone visits        Phone call duration: 15 minutes    Sleep Study Follow-Up Visit:    Date on this visit: 11/4/2022    Fredi Gibbs comes in today for follow-up of Fredi Gibbs's home sleep study done on 10/20/22 for possible sleep apnea.    AHI was 6.7, without desaturations.Supine RDI 6.7, consistent with mild SUPINE MG. These findings were reviewed with patient.     Past medical/surgical history, family history, social history, medications and allergies were reviewed.      Problem List:  Patient Active Problem List    Diagnosis Date Noted     CARDIOVASCULAR SCREENING; LDL GOAL LESS THAN 160 10/31/2010     Priority: Medium        Impression/Plan:    Mild sleep apnea- discussed oral appliance, weight loss, cpap. Patient is going to take some time to consider the options and will contact the sleep center with a decision.   Fredi Gibbs will follow up with me as needed.      Fifteen minutes spent with patient, all of which were spent face-to-face counseling, consulting, coordinating plan of care.          CC: No Ref-Primary, Physician      Yes

## 2022-12-05 ENCOUNTER — TELEPHONE (OUTPATIENT)
Dept: SLEEP MEDICINE | Facility: CLINIC | Age: 42
End: 2022-12-05

## 2022-12-05 DIAGNOSIS — G47.33 OSA (OBSTRUCTIVE SLEEP APNEA): Primary | ICD-10-CM

## 2022-12-05 NOTE — TELEPHONE ENCOUNTER
Patient has decided he would like to start CPAP therapy. Writer gave him the phone number to [facility] and suggested he call them later today to get the process started.     Evangelina ARTIS CMA

## 2022-12-15 ENCOUNTER — TELEPHONE (OUTPATIENT)
Dept: SLEEP MEDICINE | Facility: CLINIC | Age: 42
End: 2022-12-15

## 2022-12-15 NOTE — TELEPHONE ENCOUNTER
[facility] SHOW ROOM 12/29/22 @2PM    CALLED PT AND SCHEDULED HIM FOR [place] BECAUSE [facility] IS SCHEDULING OUT TO JANUARY.    Charanjit Joseph, Respiratory DME Coordinator

## 2022-12-29 ENCOUNTER — DOCUMENTATION ONLY (OUTPATIENT)
Dept: SLEEP MEDICINE | Facility: CLINIC | Age: 42
End: 2022-12-29
Payer: COMMERCIAL

## 2022-12-29 DIAGNOSIS — G47.33 OBSTRUCTIVE SLEEP APNEA (ADULT) (PEDIATRIC): Primary | ICD-10-CM

## 2022-12-29 NOTE — PROGRESS NOTES
Patient was offered choice of vendor and chose [facility].  Patient Fredi Gibbs was set up at [facility]  on December 29, 2022. Patient received a Resmed Airsense 11 Pressures were set at 5-10 cm H2O.   Patient s ramp is 5 cm H2O for Off and FLEX/EPR is EPR, 2.  Patient received a Resmed Mask name: Airfit [id number]  Nasal mask size Medium, heated tubing and heated humidifier.  Patient does not need to meet compliance. Patient has a follow up on TBD with Declan Leums

## 2023-01-02 ENCOUNTER — DOCUMENTATION ONLY (OUTPATIENT)
Dept: SLEEP MEDICINE | Facility: CLINIC | Age: 43
End: 2023-01-02
Payer: COMMERCIAL

## 2023-01-02 NOTE — PROGRESS NOTES
3 day Sleep therapy management telephone visit    Diagnostic AHI:  6.7 HST    Confirmed with patient at time of call- Yes Patient is still interested in STM service       Subjective measures:   Patient reports they have not set up their CPAP yet. Patient was provided my contact information if they have any questions or concerns.         Order settings:  CPAP MIN CPAP MAX   5 cm H2O 10 cm H2O       Device settings:  CPAP MIN CPAP MAX EPR RESMED SOFT RESPONSE SETTING   5 cm  H20 10 cm  H20 2 OFF       Compliance 0 %    Assessment: No usage but has account in [employer]      Action plan: Patient to have 14 day STM visit. Patient has a follow up visit scheduled:   yes within 31-90 days of set up    Replacement device: No  STM ordered by provider: Yes     Total time spent on accessing and  interpreting remote patient PAP therapy data  10 minutes    Total time spent counseling, coaching  and reviewing PAP therapy data with patient  1 minutes    [id number] no

## 2023-01-08 ENCOUNTER — HEALTH MAINTENANCE LETTER (OUTPATIENT)
Age: 43
End: 2023-01-08

## 2023-01-13 ENCOUNTER — DOCUMENTATION ONLY (OUTPATIENT)
Dept: SLEEP MEDICINE | Facility: CLINIC | Age: 43
End: 2023-01-13
Payer: COMMERCIAL

## 2023-01-13 NOTE — PROGRESS NOTES
14  DAY STM VISIT    Diagnostic AHI:  6.7 HST    Message left for patient to return call     Assessment: Pt not meeting objective benchmarks for compliance      Action plan: waiting for patient to return call.  and pt to have 30 day STM visit.      Device type: Auto-CPAP    PAP settings:  CPAP MIN CPAP MAX 95TH % PRESSURE EPR RESMED SOFT RESPONSE SETTING   5.0 cm  H20 10.0 cm  H20 9 cm  H20  TWO OFF     Mask type:  Nasal Mask    Objective measures: 14 day rolling measures   COMPLIANCE LEAK AHI AVERAGE USE IN MINUTES   66 % 10.5 1.13 264   GOAL >70% GOAL < 24 LPM GOAL <5 GOAL >240          Total time spent on accessing and interpreting remote patient PAP therapy data  10 minutes    Total time spent counseling, coaching  and reviewing PAP therapy data with patient  1 minute    [id number]  [id number]  no (3 day STM)

## 2023-01-31 ENCOUNTER — DOCUMENTATION ONLY (OUTPATIENT)
Dept: SLEEP MEDICINE | Facility: CLINIC | Age: 43
End: 2023-01-31
Payer: COMMERCIAL

## 2023-01-31 NOTE — PROGRESS NOTES
30 DAY [facility] VISIT    Diagnostic AHI:  6.7 HST    Message left for patient to return call     Assessment: Pt not meeting objective benchmarks for compliance     Action plan: waiting for patient to return call.   Patient has scheduled a follow up visit with Declan Villegas PA-C on 4/14/2023.   Device type: Auto-CPAP  PAP settings: CPAP min 5.0 cm  H20     CPAP max 10.0 cm  H20     RESMED EPR level Setting: TWO    RESMED Soft response setting:  OFF  Mask type:  Nasal Mask  Objective measures: 14 day rolling measures            Objective measure goal  Compliance   Goal >70%  Leak   Goal < 24 lpm  AHI  Goal < 5  Usage  Goal >240        Total time spent on accessing and interpreting remote patient PAP therapy data  10 minutes    Total time spent counseling, coaching  and reviewing PAP therapy data with patient  1 minutes     [id number] this call  [id number] no  at 3 or 14 day [facility]

## 2024-02-11 ENCOUNTER — HEALTH MAINTENANCE LETTER (OUTPATIENT)
Age: 44
End: 2024-02-11

## 2025-03-08 ENCOUNTER — HEALTH MAINTENANCE LETTER (OUTPATIENT)
Age: 45
End: 2025-03-08